# Patient Record
Sex: MALE | Race: BLACK OR AFRICAN AMERICAN | NOT HISPANIC OR LATINO | URBAN - METROPOLITAN AREA
[De-identification: names, ages, dates, MRNs, and addresses within clinical notes are randomized per-mention and may not be internally consistent; named-entity substitution may affect disease eponyms.]

---

## 2017-01-18 ENCOUNTER — EMERGENCY (EMERGENCY)
Facility: HOSPITAL | Age: 37
LOS: 1 days | Discharge: HOME | End: 2017-01-18
Attending: EMERGENCY MEDICINE | Admitting: EMERGENCY MEDICINE
Payer: COMMERCIAL

## 2017-01-18 VITALS
TEMPERATURE: 99 F | DIASTOLIC BLOOD PRESSURE: 79 MMHG | OXYGEN SATURATION: 99 % | RESPIRATION RATE: 16 BRPM | SYSTOLIC BLOOD PRESSURE: 161 MMHG | HEART RATE: 85 BPM

## 2017-01-18 VITALS
SYSTOLIC BLOOD PRESSURE: 119 MMHG | RESPIRATION RATE: 16 BRPM | OXYGEN SATURATION: 98 % | DIASTOLIC BLOOD PRESSURE: 69 MMHG | TEMPERATURE: 98 F | HEART RATE: 77 BPM

## 2017-01-18 DIAGNOSIS — R10.13 EPIGASTRIC PAIN: ICD-10-CM

## 2017-01-18 DIAGNOSIS — R53.81 OTHER MALAISE: ICD-10-CM

## 2017-01-18 DIAGNOSIS — R11.2 NAUSEA WITH VOMITING, UNSPECIFIED: ICD-10-CM

## 2017-01-18 LAB
ALBUMIN SERPL ELPH-MCNC: 4.2 G/DL — SIGNIFICANT CHANGE UP (ref 3.4–5)
ALP SERPL-CCNC: 126 U/L — HIGH (ref 40–120)
ALT FLD-CCNC: 28 U/L — SIGNIFICANT CHANGE UP (ref 12–42)
ANION GAP SERPL CALC-SCNC: 14 MMOL/L — SIGNIFICANT CHANGE UP (ref 9–16)
AST SERPL-CCNC: 18 U/L — SIGNIFICANT CHANGE UP (ref 15–37)
BASOPHILS NFR BLD AUTO: 0.4 % — SIGNIFICANT CHANGE UP (ref 0–2)
BILIRUB SERPL-MCNC: 0.5 MG/DL — SIGNIFICANT CHANGE UP (ref 0.2–1.2)
BUN SERPL-MCNC: 12 MG/DL — SIGNIFICANT CHANGE UP (ref 7–23)
CALCIUM SERPL-MCNC: 9.3 MG/DL — SIGNIFICANT CHANGE UP (ref 8.5–10.5)
CHLORIDE SERPL-SCNC: 105 MMOL/L — SIGNIFICANT CHANGE UP (ref 96–108)
CO2 SERPL-SCNC: 23 MMOL/L — SIGNIFICANT CHANGE UP (ref 22–31)
CREAT SERPL-MCNC: 1 MG/DL — SIGNIFICANT CHANGE UP (ref 0.5–1.3)
EOSINOPHIL NFR BLD AUTO: 1.5 % — SIGNIFICANT CHANGE UP (ref 0–6)
GLUCOSE SERPL-MCNC: 127 MG/DL — HIGH (ref 70–99)
HCT VFR BLD CALC: 46.6 % — SIGNIFICANT CHANGE UP (ref 39–50)
HGB BLD-MCNC: 16.5 G/DL — SIGNIFICANT CHANGE UP (ref 13–17)
IMM GRANULOCYTES NFR BLD AUTO: 0.5 % — SIGNIFICANT CHANGE UP (ref 0–1.5)
LIDOCAIN IGE QN: 161 U/L — SIGNIFICANT CHANGE UP (ref 73–393)
LYMPHOCYTES # BLD AUTO: 23.1 % — SIGNIFICANT CHANGE UP (ref 13–44)
MCHC RBC-ENTMCNC: 30.4 PG — SIGNIFICANT CHANGE UP (ref 27–34)
MCHC RBC-ENTMCNC: 35.4 G/DL — SIGNIFICANT CHANGE UP (ref 32–36)
MCV RBC AUTO: 86 FL — SIGNIFICANT CHANGE UP (ref 80–100)
MONOCYTES NFR BLD AUTO: 6.9 % — SIGNIFICANT CHANGE UP (ref 2–14)
NEUTROPHILS NFR BLD AUTO: 67.6 % — SIGNIFICANT CHANGE UP (ref 43–77)
PLATELET # BLD AUTO: 194 K/UL — SIGNIFICANT CHANGE UP (ref 150–400)
POTASSIUM SERPL-MCNC: 3.8 MMOL/L — SIGNIFICANT CHANGE UP (ref 3.5–5.3)
POTASSIUM SERPL-SCNC: 3.8 MMOL/L — SIGNIFICANT CHANGE UP (ref 3.5–5.3)
PROT SERPL-MCNC: 8.1 G/DL — SIGNIFICANT CHANGE UP (ref 6.4–8.2)
RBC # BLD: 5.42 M/UL — SIGNIFICANT CHANGE UP (ref 4.2–5.8)
RBC # FLD: 13.4 % — SIGNIFICANT CHANGE UP (ref 10.3–16.9)
SODIUM SERPL-SCNC: 142 MMOL/L — SIGNIFICANT CHANGE UP (ref 132–145)
WBC # BLD: 10.9 K/UL — HIGH (ref 3.8–10.5)
WBC # FLD AUTO: 10.9 K/UL — HIGH (ref 3.8–10.5)

## 2017-01-18 PROCEDURE — 99283 EMERGENCY DEPT VISIT LOW MDM: CPT

## 2017-01-18 RX ORDER — ONDANSETRON 8 MG/1
4 TABLET, FILM COATED ORAL ONCE
Qty: 0 | Refills: 0 | Status: COMPLETED | OUTPATIENT
Start: 2017-01-18 | End: 2017-01-18

## 2017-01-18 RX ORDER — HALOPERIDOL DECANOATE 100 MG/ML
5 INJECTION INTRAMUSCULAR ONCE
Qty: 0 | Refills: 0 | Status: COMPLETED | OUTPATIENT
Start: 2017-01-18 | End: 2017-01-18

## 2017-01-18 RX ORDER — HALOPERIDOL DECANOATE 100 MG/ML
5 INJECTION INTRAMUSCULAR ONCE
Qty: 0 | Refills: 0 | Status: DISCONTINUED | OUTPATIENT
Start: 2017-01-18 | End: 2017-01-22

## 2017-01-18 RX ORDER — FAMOTIDINE 10 MG/ML
20 INJECTION INTRAVENOUS ONCE
Qty: 0 | Refills: 0 | Status: COMPLETED | OUTPATIENT
Start: 2017-01-18 | End: 2017-01-18

## 2017-01-18 RX ORDER — SODIUM CHLORIDE 9 MG/ML
1000 INJECTION INTRAMUSCULAR; INTRAVENOUS; SUBCUTANEOUS ONCE
Qty: 0 | Refills: 0 | Status: COMPLETED | OUTPATIENT
Start: 2017-01-18 | End: 2017-01-18

## 2017-01-18 RX ORDER — METOCLOPRAMIDE HCL 10 MG
10 TABLET ORAL ONCE
Qty: 0 | Refills: 0 | Status: DISCONTINUED | OUTPATIENT
Start: 2017-01-18 | End: 2017-01-18

## 2017-01-18 RX ADMIN — Medication 2 MILLIGRAM(S): at 10:27

## 2017-01-18 RX ADMIN — ONDANSETRON 4 MILLIGRAM(S): 8 TABLET, FILM COATED ORAL at 10:09

## 2017-01-18 NOTE — ED PROVIDER NOTE - PROGRESS NOTE DETAILS
pt removed IV, refusing medications at this time, requesting warm shower since that usually helps. now sleeping, comfortable, no distress

## 2017-01-18 NOTE — ED ADULT NURSE NOTE - OBJECTIVE STATEMENT
pt reports abdominal pain, nausea and vomiting since this morning.  pt is jumping around, fanning himself, cannot sit still.  pt reports smoking pot everyday. denies any other drug use.  pt pulled out his IV 30 minutes after it was inserted.

## 2017-01-18 NOTE — ED PROVIDER NOTE - MEDICAL DECISION MAKING DETAILS
suspicious presentation for cyclic vomiting given hx of canabis use daily and previous similar episodes. Will treat w IVFs, antiemetics, IV haldol and reassess. Cardiac monitor and EKG done prior to haldol.

## 2017-01-18 NOTE — ED PROVIDER NOTE - CONSTITUTIONAL, MLM
normal (ped)... in distress due to pain, and nausea, actively vomiting during evaluation, poorly groomed

## 2017-01-18 NOTE — ED PROVIDER NOTE - OBJECTIVE STATEMENT
abd pain, nausea, vomiting 35 y/o male pt, hx of gastritis, nkda, no abd surgeries, today with epigastric abd pain, nausea, vomiting. Pt states he has had similar episodes in the past in which he gets bad gastritis and he was dx and tx for H pylori before. Denies chest pain, SOB, HA, neck pain. Drinks alcohol socially, smokes marihuana daily

## 2017-02-27 ENCOUNTER — EMERGENCY (EMERGENCY)
Facility: HOSPITAL | Age: 37
LOS: 1 days | Discharge: HOME | End: 2017-02-27
Attending: EMERGENCY MEDICINE | Admitting: EMERGENCY MEDICINE
Payer: COMMERCIAL

## 2017-02-27 VITALS
SYSTOLIC BLOOD PRESSURE: 103 MMHG | RESPIRATION RATE: 20 BRPM | DIASTOLIC BLOOD PRESSURE: 74 MMHG | OXYGEN SATURATION: 99 % | HEART RATE: 67 BPM

## 2017-02-27 VITALS
TEMPERATURE: 99 F | HEART RATE: 68 BPM | RESPIRATION RATE: 20 BRPM | OXYGEN SATURATION: 99 % | DIASTOLIC BLOOD PRESSURE: 76 MMHG | SYSTOLIC BLOOD PRESSURE: 110 MMHG

## 2017-02-27 DIAGNOSIS — F17.200 NICOTINE DEPENDENCE, UNSPECIFIED, UNCOMPLICATED: ICD-10-CM

## 2017-02-27 DIAGNOSIS — R10.13 EPIGASTRIC PAIN: ICD-10-CM

## 2017-02-27 DIAGNOSIS — R11.10 VOMITING, UNSPECIFIED: ICD-10-CM

## 2017-02-27 DIAGNOSIS — F12.988 CANNABIS USE, UNSPECIFIED WITH OTHER CANNABIS-INDUCED DISORDER: ICD-10-CM

## 2017-02-27 PROBLEM — K85.90 ACUTE PANCREATITIS WITHOUT NECROSIS OR INFECTION, UNSPECIFIED: Chronic | Status: ACTIVE | Noted: 2017-01-18

## 2017-02-27 PROBLEM — K29.70 GASTRITIS, UNSPECIFIED, WITHOUT BLEEDING: Chronic | Status: ACTIVE | Noted: 2017-01-18

## 2017-02-27 PROBLEM — K27.9 PEPTIC ULCER, SITE UNSPECIFIED, UNSPECIFIED AS ACUTE OR CHRONIC, WITHOUT HEMORRHAGE OR PERFORATION: Chronic | Status: ACTIVE | Noted: 2017-01-18

## 2017-02-27 LAB
ALBUMIN SERPL ELPH-MCNC: 3.7 G/DL — SIGNIFICANT CHANGE UP (ref 3.4–5)
ALP SERPL-CCNC: 99 U/L — SIGNIFICANT CHANGE UP (ref 40–120)
ALT FLD-CCNC: 34 U/L — SIGNIFICANT CHANGE UP (ref 12–42)
ANION GAP SERPL CALC-SCNC: 6 MMOL/L — LOW (ref 9–16)
AST SERPL-CCNC: 19 U/L — SIGNIFICANT CHANGE UP (ref 15–37)
BASOPHILS NFR BLD AUTO: 0.1 % — SIGNIFICANT CHANGE UP (ref 0–2)
BILIRUB SERPL-MCNC: 0.3 MG/DL — SIGNIFICANT CHANGE UP (ref 0.2–1.2)
BUN SERPL-MCNC: 7 MG/DL — SIGNIFICANT CHANGE UP (ref 7–23)
CALCIUM SERPL-MCNC: 8.6 MG/DL — SIGNIFICANT CHANGE UP (ref 8.5–10.5)
CHLORIDE SERPL-SCNC: 107 MMOL/L — SIGNIFICANT CHANGE UP (ref 96–108)
CO2 SERPL-SCNC: 25 MMOL/L — SIGNIFICANT CHANGE UP (ref 22–31)
CREAT SERPL-MCNC: 0.82 MG/DL — SIGNIFICANT CHANGE UP (ref 0.5–1.3)
EOSINOPHIL NFR BLD AUTO: 1.1 % — SIGNIFICANT CHANGE UP (ref 0–6)
EXTRA BLUE TOP TUBE: SIGNIFICANT CHANGE UP
EXTRA SST TUBE: SIGNIFICANT CHANGE UP
GLUCOSE SERPL-MCNC: 118 MG/DL — HIGH (ref 70–99)
HCT VFR BLD CALC: 43.8 % — SIGNIFICANT CHANGE UP (ref 39–50)
HGB BLD-MCNC: 15.4 G/DL — SIGNIFICANT CHANGE UP (ref 13–17)
LYMPHOCYTES # BLD AUTO: 21.9 % — SIGNIFICANT CHANGE UP (ref 13–44)
MCHC RBC-ENTMCNC: 30.4 PG — SIGNIFICANT CHANGE UP (ref 27–34)
MCHC RBC-ENTMCNC: 35.2 G/DL — SIGNIFICANT CHANGE UP (ref 32–36)
MCV RBC AUTO: 86.6 FL — SIGNIFICANT CHANGE UP (ref 80–100)
MONOCYTES NFR BLD AUTO: 6.4 % — SIGNIFICANT CHANGE UP (ref 2–14)
NEUTROPHILS NFR BLD AUTO: 70.5 % — SIGNIFICANT CHANGE UP (ref 43–77)
PLATELET # BLD AUTO: 154 K/UL — SIGNIFICANT CHANGE UP (ref 150–400)
POTASSIUM SERPL-MCNC: 3.9 MMOL/L — SIGNIFICANT CHANGE UP (ref 3.5–5.3)
POTASSIUM SERPL-SCNC: 3.9 MMOL/L — SIGNIFICANT CHANGE UP (ref 3.5–5.3)
PROT SERPL-MCNC: 7.4 G/DL — SIGNIFICANT CHANGE UP (ref 6.4–8.2)
RBC # BLD: 5.06 M/UL — SIGNIFICANT CHANGE UP (ref 4.2–5.8)
RBC # FLD: 14.4 % — SIGNIFICANT CHANGE UP (ref 10.3–16.9)
SODIUM SERPL-SCNC: 138 MMOL/L — SIGNIFICANT CHANGE UP (ref 135–145)
WBC # BLD: 8.6 K/UL — SIGNIFICANT CHANGE UP (ref 3.8–10.5)
WBC # FLD AUTO: 8.6 K/UL — SIGNIFICANT CHANGE UP (ref 3.8–10.5)

## 2017-02-27 PROCEDURE — 36415 COLL VENOUS BLD VENIPUNCTURE: CPT

## 2017-02-27 PROCEDURE — 96375 TX/PRO/DX INJ NEW DRUG ADDON: CPT

## 2017-02-27 PROCEDURE — 80053 COMPREHEN METABOLIC PANEL: CPT

## 2017-02-27 PROCEDURE — 99284 EMERGENCY DEPT VISIT MOD MDM: CPT | Mod: 25

## 2017-02-27 PROCEDURE — 93005 ELECTROCARDIOGRAM TRACING: CPT

## 2017-02-27 PROCEDURE — 96374 THER/PROPH/DIAG INJ IV PUSH: CPT

## 2017-02-27 PROCEDURE — 85025 COMPLETE CBC W/AUTO DIFF WBC: CPT

## 2017-02-27 PROCEDURE — 83690 ASSAY OF LIPASE: CPT

## 2017-02-27 PROCEDURE — 93010 ELECTROCARDIOGRAM REPORT: CPT

## 2017-02-27 RX ORDER — HALOPERIDOL DECANOATE 100 MG/ML
5 INJECTION INTRAMUSCULAR ONCE
Qty: 0 | Refills: 0 | Status: COMPLETED | OUTPATIENT
Start: 2017-02-27 | End: 2017-02-27

## 2017-02-27 RX ORDER — ONDANSETRON 8 MG/1
4 TABLET, FILM COATED ORAL ONCE
Qty: 0 | Refills: 0 | Status: COMPLETED | OUTPATIENT
Start: 2017-02-27 | End: 2017-02-27

## 2017-02-27 RX ORDER — DIPHENHYDRAMINE HCL 50 MG
25 CAPSULE ORAL ONCE
Qty: 0 | Refills: 0 | Status: COMPLETED | OUTPATIENT
Start: 2017-02-27 | End: 2017-02-27

## 2017-02-27 RX ORDER — FAMOTIDINE 10 MG/ML
20 INJECTION INTRAVENOUS ONCE
Qty: 0 | Refills: 0 | Status: COMPLETED | OUTPATIENT
Start: 2017-02-27 | End: 2017-02-27

## 2017-02-27 RX ORDER — SODIUM CHLORIDE 9 MG/ML
1000 INJECTION INTRAMUSCULAR; INTRAVENOUS; SUBCUTANEOUS ONCE
Qty: 0 | Refills: 0 | Status: COMPLETED | OUTPATIENT
Start: 2017-02-27 | End: 2017-02-27

## 2017-02-27 RX ORDER — METOCLOPRAMIDE HCL 10 MG
10 TABLET ORAL ONCE
Qty: 0 | Refills: 0 | Status: COMPLETED | OUTPATIENT
Start: 2017-02-27 | End: 2017-02-27

## 2017-02-27 RX ADMIN — Medication 25 MILLIGRAM(S): at 11:26

## 2017-02-27 RX ADMIN — Medication 30 MILLILITER(S): at 11:27

## 2017-02-27 RX ADMIN — ONDANSETRON 4 MILLIGRAM(S): 8 TABLET, FILM COATED ORAL at 12:09

## 2017-02-27 RX ADMIN — HALOPERIDOL DECANOATE 5 MILLIGRAM(S): 100 INJECTION INTRAMUSCULAR at 11:55

## 2017-02-27 RX ADMIN — FAMOTIDINE 20 MILLIGRAM(S): 10 INJECTION INTRAVENOUS at 11:27

## 2017-02-27 RX ADMIN — Medication 1 MILLIGRAM(S): at 12:47

## 2017-02-27 RX ADMIN — Medication 10 MILLIGRAM(S): at 11:27

## 2017-02-27 RX ADMIN — SODIUM CHLORIDE 1000 MILLILITER(S): 9 INJECTION INTRAMUSCULAR; INTRAVENOUS; SUBCUTANEOUS at 11:29

## 2017-02-27 NOTE — ED ADULT NURSE NOTE - OBJECTIVE STATEMENT
pt is a 37 y/o male c/o diffuse abdominal pain with vomiting since this morning. PMH stomach ulcers. denies any bloody emesis/stool, chest pain, hematuria, pt is a 37 y/o male c/o diffuse abdominal pain with vomiting since this morning. PMH stomach ulcers. pt reports smoking weed every single day. denies any alcohol use, bloody emesis/stool, chest pain, hematuria, prior tx. pt vomiting bile on arrival. VS stable, continue to monitor.

## 2017-02-27 NOTE — ED PROVIDER NOTE - OBJECTIVE STATEMENT
36yom no PMH no PSH but daily marijuana smoker p/w epigastric pain, N/V since this morning. Similar symptoms previously, most recently seen ~1 mo ago at [facility] and told he needed to quit smoking but has continued to use. No fevers or diarrhea. No other symptoms/complaints. Symptoms usually improve w/hot shower, he is requesting to take one here.

## 2017-02-27 NOTE — ED PROVIDER NOTE - MEDICAL DECISION MAKING DETAILS
Pt with epigastric discomfort (although no tenderness,) N/V w/daily marijuana use. Suspect cyclical vomiting d/t cannabis use. Plan to treat symptomatically, given PO haloperidol, no EKG changes prior to administration. If labs reassuring pt can be discharged, he was already counseled re: cessation of marijuana use

## 2017-02-27 NOTE — ED ADULT NURSE NOTE - CHPI ED SYMPTOMS NEG
no burning urination/no chills/no fever/no blood in stool/no hematuria/no dysuria/no diarrhea/no abdominal distension

## 2021-08-07 ENCOUNTER — CLAIMS CREATED FROM THE CLAIM WINDOW (OUTPATIENT)
Dept: URBAN - METROPOLITAN AREA MEDICAL CENTER 19 | Facility: MEDICAL CENTER | Age: 41
End: 2021-08-07
Payer: SELF-PAY

## 2021-08-07 DIAGNOSIS — R11.2 NAUSEA WITH VOMITING, UNSPECIFIED: ICD-10-CM

## 2021-08-07 DIAGNOSIS — D69.49 OTHER PRIMARY THROMBOCYTOPENIA: ICD-10-CM

## 2021-08-07 DIAGNOSIS — R74.8 ABNORMAL LEVELS OF OTHER SERUM ENZYMES: ICD-10-CM

## 2021-08-07 DIAGNOSIS — R10.84 GENERALIZED ABDOMINAL PAIN: ICD-10-CM

## 2021-08-07 PROCEDURE — 99254 IP/OBS CNSLTJ NEW/EST MOD 60: CPT | Performed by: INTERNAL MEDICINE

## 2021-08-07 PROCEDURE — 99244 OFF/OP CNSLTJ NEW/EST MOD 40: CPT | Performed by: INTERNAL MEDICINE

## 2022-08-09 ENCOUNTER — CLAIMS CREATED FROM THE CLAIM WINDOW (OUTPATIENT)
Dept: URBAN - METROPOLITAN AREA MEDICAL CENTER 2 | Facility: MEDICAL CENTER | Age: 42
End: 2022-08-09
Payer: SELF-PAY

## 2022-08-09 DIAGNOSIS — D69.6 THROMBOCYTOPENIA, UNSPECIFIED: ICD-10-CM

## 2022-08-09 DIAGNOSIS — R10.816 ABDOMINAL TENDERNESS, EPIGASTRIC: ICD-10-CM

## 2022-08-09 DIAGNOSIS — D50.9 ANEMIA: ICD-10-CM

## 2022-08-09 DIAGNOSIS — Z79.1 ENCNTR LONG-TERM NSAID USE: ICD-10-CM

## 2022-08-09 DIAGNOSIS — K92.1 ACUTE MELENA: ICD-10-CM

## 2022-08-09 PROCEDURE — 99254 IP/OBS CNSLTJ NEW/EST MOD 60: CPT | Performed by: INTERNAL MEDICINE

## 2022-08-10 ENCOUNTER — CLAIMS CREATED FROM THE CLAIM WINDOW (OUTPATIENT)
Dept: URBAN - METROPOLITAN AREA MEDICAL CENTER 2 | Facility: MEDICAL CENTER | Age: 42
End: 2022-08-10
Payer: SELF-PAY

## 2022-08-10 DIAGNOSIS — R77.8 ELEVATED TROPONIN: ICD-10-CM

## 2022-08-10 DIAGNOSIS — D69.59 ITP SECONDARY TO INFECTION: ICD-10-CM

## 2022-08-10 DIAGNOSIS — K92.1 ACUTE MELENA: ICD-10-CM

## 2022-08-10 DIAGNOSIS — D50.9 ANEMIA: ICD-10-CM

## 2022-08-10 PROCEDURE — 99232 SBSQ HOSP IP/OBS MODERATE 35: CPT | Performed by: INTERNAL MEDICINE

## 2022-08-10 PROCEDURE — 99233 SBSQ HOSP IP/OBS HIGH 50: CPT | Performed by: INTERNAL MEDICINE

## 2022-08-11 ENCOUNTER — CLAIMS CREATED FROM THE CLAIM WINDOW (OUTPATIENT)
Dept: URBAN - METROPOLITAN AREA MEDICAL CENTER 2 | Facility: MEDICAL CENTER | Age: 42
End: 2022-08-11
Payer: SELF-PAY

## 2022-08-11 DIAGNOSIS — D50.9 ANEMIA: ICD-10-CM

## 2022-08-11 DIAGNOSIS — K92.1 ACUTE MELENA: ICD-10-CM

## 2022-08-11 DIAGNOSIS — K59.09 CHANGE IN BOWEL MOVEMENTS INTERMITTENT CONSTIPATION. URGENCY IN THE MORNING.: ICD-10-CM

## 2022-08-11 DIAGNOSIS — R10.13 ABDOMINAL DISCOMFORT, EPIGASTRIC: ICD-10-CM

## 2022-08-11 DIAGNOSIS — R77.8 ELEVATED TROPONIN: ICD-10-CM

## 2022-08-11 DIAGNOSIS — D69.59 ITP SECONDARY TO INFECTION: ICD-10-CM

## 2022-08-11 PROCEDURE — 99233 SBSQ HOSP IP/OBS HIGH 50: CPT | Performed by: INTERNAL MEDICINE

## 2022-08-11 PROCEDURE — 99232 SBSQ HOSP IP/OBS MODERATE 35: CPT | Performed by: INTERNAL MEDICINE

## 2022-08-12 ENCOUNTER — CLAIMS CREATED FROM THE CLAIM WINDOW (OUTPATIENT)
Dept: URBAN - METROPOLITAN AREA MEDICAL CENTER 2 | Facility: MEDICAL CENTER | Age: 42
End: 2022-08-12
Payer: SELF-PAY

## 2022-08-12 DIAGNOSIS — K59.09 CHANGE IN BOWEL MOVEMENTS INTERMITTENT CONSTIPATION. URGENCY IN THE MORNING.: ICD-10-CM

## 2022-08-12 DIAGNOSIS — K92.1 ACUTE MELENA: ICD-10-CM

## 2022-08-12 DIAGNOSIS — D69.59 ITP SECONDARY TO INFECTION: ICD-10-CM

## 2022-08-12 DIAGNOSIS — D50.9 ANEMIA: ICD-10-CM

## 2022-08-12 PROCEDURE — 99232 SBSQ HOSP IP/OBS MODERATE 35: CPT | Performed by: INTERNAL MEDICINE

## 2022-08-17 PROBLEM — 271737000 ANEMIA: Status: ACTIVE | Noted: 2022-08-17

## 2022-08-18 ENCOUNTER — CLAIMS CREATED FROM THE CLAIM WINDOW (OUTPATIENT)
Dept: URBAN - METROPOLITAN AREA MEDICAL CENTER 2 | Facility: MEDICAL CENTER | Age: 42
End: 2022-08-18
Payer: SELF-PAY

## 2022-08-18 DIAGNOSIS — K20.80 ESOPHAGITIS DISSECANS SUPERFICIALIS: ICD-10-CM

## 2022-08-18 DIAGNOSIS — K92.1 ACUTE MELENA: ICD-10-CM

## 2022-08-18 DIAGNOSIS — R10.13 ABDOMINAL DISCOMFORT, EPIGASTRIC: ICD-10-CM

## 2022-08-18 DIAGNOSIS — K31.89 ACQUIRED DEFORMITY OF DUODENUM: ICD-10-CM

## 2022-08-18 DIAGNOSIS — K44.9 DIAPHRAGMATIC HERNIA: ICD-10-CM

## 2022-08-18 PROCEDURE — 43239 EGD BIOPSY SINGLE/MULTIPLE: CPT | Performed by: INTERNAL MEDICINE

## 2022-08-22 ENCOUNTER — TELEPHONE ENCOUNTER (OUTPATIENT)
Dept: URBAN - METROPOLITAN AREA CLINIC 113 | Facility: CLINIC | Age: 42
End: 2022-08-22

## 2023-01-10 NOTE — ED PROVIDER NOTE - TOBACCO USE
REHAB CARE PROGRESSION MEETING:    Medical Team Conference total time 25 minutes.  Present with patient, wife and brother for 25 minutes.  See progress noted dated today, taya by Kb Montague,  for details.     Unknown if ever smoked

## 2024-03-22 NOTE — ED ADULT NURSE NOTE - EXTENSIONS OF SELF_ADULT
L/m on v/m for patient regarding appointment scheduled for 04/09/2024.  Will need to reschedule as provider is out this day.  He was asked to call back to reschedule to a new date/time with either Dr. Payan or Summer Carr PA-C.       None